# Patient Record
Sex: FEMALE | Race: WHITE | NOT HISPANIC OR LATINO | Employment: FULL TIME | URBAN - METROPOLITAN AREA
[De-identification: names, ages, dates, MRNs, and addresses within clinical notes are randomized per-mention and may not be internally consistent; named-entity substitution may affect disease eponyms.]

---

## 2017-08-30 ENCOUNTER — HISTORICAL (OUTPATIENT)
Dept: ADMINISTRATIVE | Facility: HOSPITAL | Age: 37
End: 2017-08-30

## 2017-10-06 ENCOUNTER — HISTORICAL (OUTPATIENT)
Dept: ADMINISTRATIVE | Facility: HOSPITAL | Age: 37
End: 2017-10-06

## 2017-10-06 LAB — DEPRECATED CALCIDIOL+CALCIFEROL SERPL-MC: 30.82 NG/ML (ref 30–80)

## 2017-11-15 ENCOUNTER — HISTORICAL (OUTPATIENT)
Dept: ADMINISTRATIVE | Facility: HOSPITAL | Age: 37
End: 2017-11-15

## 2017-11-15 LAB — DEPRECATED CALCIDIOL+CALCIFEROL SERPL-MC: 42.78 NG/ML (ref 30–80)

## 2018-02-28 ENCOUNTER — HISTORICAL (OUTPATIENT)
Dept: ADMINISTRATIVE | Facility: HOSPITAL | Age: 38
End: 2018-02-28

## 2018-02-28 LAB — DEPRECATED CALCIDIOL+CALCIFEROL SERPL-MC: 28.76 NG/ML (ref 30–80)

## 2018-07-25 ENCOUNTER — HISTORICAL (OUTPATIENT)
Dept: ADMINISTRATIVE | Facility: HOSPITAL | Age: 38
End: 2018-07-25

## 2023-09-21 DIAGNOSIS — F41.9 ANXIETY AND DEPRESSION: Primary | ICD-10-CM

## 2023-09-21 DIAGNOSIS — F32.A ANXIETY AND DEPRESSION: Primary | ICD-10-CM

## 2023-09-21 RX ORDER — DULOXETIN HYDROCHLORIDE 60 MG/1
60 CAPSULE, DELAYED RELEASE ORAL DAILY
Qty: 30 CAPSULE | Refills: 2 | Status: SHIPPED | OUTPATIENT
Start: 2023-09-21 | End: 2023-12-22 | Stop reason: SDUPTHER

## 2023-09-21 RX ORDER — DULOXETIN HYDROCHLORIDE 60 MG/1
60 CAPSULE, DELAYED RELEASE ORAL DAILY
COMMUNITY
Start: 2023-08-15 | End: 2023-09-21 | Stop reason: SDUPTHER

## 2023-11-29 DIAGNOSIS — E78.5 HYPERLIPIDEMIA, UNSPECIFIED HYPERLIPIDEMIA TYPE: Primary | ICD-10-CM

## 2023-11-29 RX ORDER — ROSUVASTATIN CALCIUM 40 MG/1
40 TABLET, COATED ORAL NIGHTLY
Qty: 30 TABLET | Refills: 0 | Status: SHIPPED | OUTPATIENT
Start: 2023-11-29 | End: 2023-12-26 | Stop reason: SDUPTHER

## 2023-12-13 ENCOUNTER — LAB VISIT (OUTPATIENT)
Dept: FAMILY MEDICINE | Facility: CLINIC | Age: 43
End: 2023-12-13
Payer: COMMERCIAL

## 2023-12-13 DIAGNOSIS — E78.5 HYPERLIPIDEMIA, UNSPECIFIED HYPERLIPIDEMIA TYPE: ICD-10-CM

## 2023-12-13 DIAGNOSIS — E11.9 DIABETES MELLITUS WITHOUT COMPLICATION: Primary | ICD-10-CM

## 2023-12-13 DIAGNOSIS — Z79.899 ENCOUNTER FOR LONG-TERM (CURRENT) USE OF OTHER MEDICATIONS: ICD-10-CM

## 2023-12-22 ENCOUNTER — TELEPHONE (OUTPATIENT)
Dept: FAMILY MEDICINE | Facility: CLINIC | Age: 43
End: 2023-12-22
Payer: COMMERCIAL

## 2023-12-22 DIAGNOSIS — F41.9 ANXIETY AND DEPRESSION: ICD-10-CM

## 2023-12-22 DIAGNOSIS — F32.A ANXIETY AND DEPRESSION: ICD-10-CM

## 2023-12-22 RX ORDER — DULOXETIN HYDROCHLORIDE 60 MG/1
60 CAPSULE, DELAYED RELEASE ORAL DAILY
Qty: 30 CAPSULE | Refills: 2 | Status: SHIPPED | OUTPATIENT
Start: 2023-12-22

## 2023-12-26 DIAGNOSIS — E78.5 HYPERLIPIDEMIA, UNSPECIFIED HYPERLIPIDEMIA TYPE: ICD-10-CM

## 2023-12-26 RX ORDER — ROSUVASTATIN CALCIUM 40 MG/1
40 TABLET, COATED ORAL NIGHTLY
Qty: 30 TABLET | Refills: 0 | Status: SHIPPED | OUTPATIENT
Start: 2023-12-26 | End: 2024-01-23 | Stop reason: SDUPTHER

## 2024-01-04 ENCOUNTER — OFFICE VISIT (OUTPATIENT)
Dept: FAMILY MEDICINE | Facility: CLINIC | Age: 44
End: 2024-01-04
Payer: COMMERCIAL

## 2024-01-04 VITALS
OXYGEN SATURATION: 98 % | WEIGHT: 184 LBS | HEART RATE: 95 BPM | BODY MASS INDEX: 34.74 KG/M2 | TEMPERATURE: 98 F | HEIGHT: 61 IN | RESPIRATION RATE: 17 BRPM | SYSTOLIC BLOOD PRESSURE: 130 MMHG | DIASTOLIC BLOOD PRESSURE: 80 MMHG

## 2024-01-04 DIAGNOSIS — I10 HYPERTENSION, UNSPECIFIED TYPE: ICD-10-CM

## 2024-01-04 DIAGNOSIS — E78.2 MIXED HYPERLIPIDEMIA: ICD-10-CM

## 2024-01-04 DIAGNOSIS — R73.03 PREDIABETES: Primary | ICD-10-CM

## 2024-01-04 DIAGNOSIS — F17.200 SMOKER: ICD-10-CM

## 2024-01-04 PROBLEM — E11.9 DM (DIABETES MELLITUS): Status: ACTIVE | Noted: 2024-01-04

## 2024-01-04 LAB
BILIRUB UR QL STRIP: NEGATIVE
GLUCOSE UR QL STRIP: NEGATIVE
KETONES UR QL STRIP: NEGATIVE
LEUKOCYTE ESTERASE UR QL STRIP: NEGATIVE
PH, POC UA: 6
POC BLOOD, URINE: NEGATIVE
POC NITRATES, URINE: NEGATIVE
PROT UR QL STRIP: NEGATIVE
SP GR UR STRIP: 1.02 (ref 1–1.03)
UROBILINOGEN UR STRIP-ACNC: NORMAL (ref 0.1–1.1)

## 2024-01-04 PROCEDURE — 1159F MED LIST DOCD IN RCRD: CPT | Mod: CPTII,,, | Performed by: FAMILY MEDICINE

## 2024-01-04 PROCEDURE — 3079F DIAST BP 80-89 MM HG: CPT | Mod: CPTII,,, | Performed by: FAMILY MEDICINE

## 2024-01-04 PROCEDURE — 81003 URINALYSIS AUTO W/O SCOPE: CPT | Mod: QW,,, | Performed by: FAMILY MEDICINE

## 2024-01-04 PROCEDURE — 3075F SYST BP GE 130 - 139MM HG: CPT | Mod: CPTII,,, | Performed by: FAMILY MEDICINE

## 2024-01-04 PROCEDURE — 3008F BODY MASS INDEX DOCD: CPT | Mod: CPTII,,, | Performed by: FAMILY MEDICINE

## 2024-01-04 PROCEDURE — 99214 OFFICE O/P EST MOD 30 MIN: CPT | Mod: ,,, | Performed by: FAMILY MEDICINE

## 2024-01-04 RX ORDER — CHOLECALCIFEROL (VITAMIN D3) 25 MCG
3000 TABLET ORAL DAILY
COMMUNITY

## 2024-01-04 NOTE — ASSESSMENT & PLAN NOTE
Pt. Is diet controled.  Follow ADA Diet. Avoid soda, simple sweets, and limit rice/pasta/breads/starches (no more than 45-50 grams per meal).  Maintain healthy weight with goal BMI <30.  Exercise 5 times per week for 30 minutes per day.  Stressed importance of daily foot exams especially if neuropathy is present.  Patient was instructed to notify physician if they notice any sores or skin lesions on the feet.  Stressed the importance of wearing proper fitting comfortable shoes i.e. diabetic footwear.  They were instructed to always wear shoes and never go barefooted.  Stressed importance of annual dilated eye exam.

## 2024-01-04 NOTE — LETTER
AUTHORIZATION FOR RELEASE OF   CONFIDENTIAL INFORMATION    Dear Dr. Harrison's Office,    We are seeing Katharine Diego, date of birth 1980, in the clinic at [facility]. Jhony Maldonado Sr., MD is the patient's PCP. Katharine Diego has an outstanding lab/procedure at the time we reviewed her chart. In order to help keep her health information updated, she has authorized us to request the following medical record(s):        ( X )  MAMMOGRAM                                      (  )  COLONOSCOPY      ( X )  PAP SMEAR                                          (  )  OUTSIDE LAB RESULTS     (  )  DEXA SCAN                                          (  )  EYE EXAM            (  )  FOOT EXAM                                          (  )  ENTIRE RECORD     (  )  OUTSIDE IMMUNIZATIONS                 (  )  _______________         Please fax records to [facility], Bourgeois, Leonard Jb. Sr., MD, [phone]     If you have any questions, please contact our office at [phone].           Patient Name: Katharine Diego  : 1980  Patient Phone #: [phone]

## 2024-01-04 NOTE — ASSESSMENT & PLAN NOTE
Lab Results   Component Value Date    TRIG 494 (H) 12/27/2023    HDL 42 12/27/2023    LDLCALC 87 12/27/2023     Patient's LDL is well controlled and at goal.

## 2024-01-04 NOTE — PROGRESS NOTES
Patient Name: Katharine Diego     Patient ID: [id number]     Chief Complaint: Results (Go over lab results.)      HPI:     Katharine Diego is a 43 y.o. female here today for follow up on pre diabetes, hypertension,and review of lab results.    Past Medical History:   Diagnosis Date    Anxiety disorder, unspecified     DM (diabetes mellitus)     Elevated lipids     History of gestational diabetes     History of migraine     HTN (hypertension)     Mixed hyperlipidemia     Neck pain     Obese     RLS (restless legs syndrome)     Smoker         Past Surgical History:   Procedure Laterality Date     SECTION      2    CHOLECYSTECTOMY      HYSTERECTOMY  2015    TONSILLECTOMY          Social History     Socioeconomic History    Marital status: Single    Number of children: 3   Tobacco Use    Smoking status: Every Day     Current packs/day: 1.00     Average packs/day: 1 pack/day for 30.0 years (30.0 ttl pk-yrs)     Types: Cigarettes     Start date:     Smokeless tobacco: Never   Substance and Sexual Activity    Alcohol use: Not Currently     Comment: occasionally    Drug use: Never    Sexual activity: Yes     Social Determinants of Health     Financial Resource Strain: Low Risk  (2024)    Overall Financial Resource Strain (CARDIA)     Difficulty of Paying Living Expenses: Not hard at all   Food Insecurity: No Food Insecurity (2024)    Hunger Vital Sign     Worried About Running Out of Food in the Last Year: Never true     Ran Out of Food in the Last Year: Never true   Transportation Needs: No Transportation Needs (2024)    PRAPARE - Transportation     Lack of Transportation (Medical): No     Lack of Transportation (Non-Medical): No   Physical Activity: Insufficiently Active (2024)    Exercise Vital Sign     Days of Exercise per Week: 5 days     Minutes of Exercise per Session: 20 min   Stress: No Stress Concern Present (2024)    [employer] - Occupational Stress  "Questionnaire     Feeling of Stress : Only a little   Social Connections: Moderately Integrated (1/4/2024)    Social Connection and Isolation Panel [NHANES]     Frequency of Communication with Friends and Family: Three times a week     Frequency of Social Gatherings with Friends and Family: Three times a week     Attends Mu-ism Services: More than 4 times per year     Active Member of Clubs or Organizations: Yes     Attends Club or Organization Meetings: More than 4 times per year     Marital Status: Never    Housing Stability: Unknown (1/4/2024)    Housing Stability Vital Sign     Unable to Pay for Housing in the Last Year: No     Unstable Housing in the Last Year: No        Current Outpatient Medications   Medication Instructions    ascorbic acid (vitamin C) (VITAMIN C) 1,000 mg, Oral, Daily    diphenhydramine HCl (ALLERGY ORAL) Oral, Daily    DULoxetine (CYMBALTA) 60 mg, Oral, Daily    ibuprofen (ADVIL,MOTRIN) 200 mg, Oral, Every 6 hours PRN    multivitamin (THERAGRAN) per tablet 1 tablet, Oral, Daily    rosuvastatin (CRESTOR) 40 mg, Oral, Nightly    vitamin D (VITAMIN D3) 3,000 Units, Oral, Daily       Review of patient's allergies indicates:  No Known Allergies       There is no immunization history on file for this patient.    Patient Care Team:  Jhony Maldonado Sr., MD as PCP - General (Family Medicine)  Juana Harrison MD (Obstetrics and Gynecology)     Subjective:     Review of Systems    10 point review of systems conducted, negative except as stated in the history of present illness. See HPI for details.    Objective:     Visit Vitals  /80 (BP Location: Left arm, Patient Position: Sitting, BP Method: Medium (Manual))   Pulse 95   Temp 98.3 °F (36.8 °C)   Resp 17   Ht 5' 0.5" (1.537 m)   Wt 83.5 kg (184 lb)   SpO2 98%   BMI 35.34 kg/m²       Physical Exam  Constitutional:       Appearance: She is obese.   HENT:      Head: Normocephalic and atraumatic.   Cardiovascular:      Rate and " Rhythm: Normal rate and regular rhythm.   Pulmonary:      Effort: Pulmonary effort is normal.      Breath sounds: Normal breath sounds.   Abdominal:      Palpations: Abdomen is soft.      Tenderness: There is no abdominal tenderness.   Musculoskeletal:         General: No swelling or tenderness. Normal range of motion.      Cervical back: Normal range of motion and neck supple.      Right lower leg: No edema.      Left lower leg: No edema.   Skin:     General: Skin is warm and dry.   Neurological:      General: No focal deficit present.      Mental Status: She is alert and oriented to person, place, and time.   Psychiatric:         Mood and Affect: Mood normal.         Assessment:       ICD-10-CM ICD-9-CM   1. Prediabetes  R73.03 790.29   2. Hypertension, unspecified type  I10 401.9   3. Mixed hyperlipidemia  E78.2 272.2   4. Smoker  F17.200 305.1       Plan:   1. Prediabetes  Overview:  Pt. is diet controlled.   Follow ADA Diet. Avoid soda, simple sweets, and limit rice/pasta/breads/starches (no more than 45-50 grams per meal).  Maintain healthy weight with goal BMI <30.  Exercise 5 times per week for 30 minutes per day.  Stressed importance of daily foot exams especially if neuropathy is present.  Patient was instructed to notify physician if they notice any sores or skin lesions on the feet.  Stressed the importance of wearing proper fitting comfortable shoes i.e. diabetic footwear.  They were instructed to always wear shoes and never go barefooted.  Stressed importance of annual dilated eye exam.     Assessment & Plan:  12/27/2023 A1C=6.2%. Pt. Well controlled.    Orders:  -     POCT Urinalysis, Dipstick, Automated, W/O Scope    2. Hypertension, unspecified type  Overview:  Low Sodium Diet (DASH Diet - Less than 2 grams of sodium per day).  Monitor blood pressure daily and log. Report consistent numbers greater than 140/90.  Maintain healthy weight with goal BMI <30. Exercise 30 minutes per day, 5 days per week.      Assessment & Plan:  Patient's blood pressure is well controlled and at goal.      3. Mixed hyperlipidemia  Overview:  Current medication: Crestor 40 mg one po q day.   Stressed importance of dietary modifications. Follow a low cholesterol, low saturated fat diet with less that 200mg of cholesterol a day.  Present medication: Crestor 40 mg one po q hs.  Avoid fried foods and high saturated fats (high saturated fats less than 7% of calories).  Add Flax Seed/Fish Oil supplements to diet. Increase dietary fiber.  Regular exercise can reduce LDL and raise HDL. Stressed importance of physical activity 5 times per week for 30 minutes per day.      Assessment & Plan:  Lab Results   Component Value Date    TRIG 494 (H) 12/27/2023    HDL 42 12/27/2023    LDLCALC 87 12/27/2023     Patient's LDL is well controlled and at goal.        4. Smoker  Overview:  Patient was offered smoking cessation techniques such as nicotine gum or patches.  They were also offered a more regimented smoking cessation program.  They were advised to decrease the number of cigarettes by 1 every few days until they completely stopped.  It was strongly recommended that they set a quit date and stick to it.  And finally, important health reasons to stop smoking were given to the patient. Approximately 5 minutes was spent discussing smoking cessation.     Assessment & Plan:  Patient says she is presently smoking approximately 1 pack of cigarettes a day.          [x] Discussed lab findings with the patient.  [x] Discussed diet, exercise and if appropriate, weight loss.  [] Instructions and information, including risks and benefits of prescribed medication(s) have been reviewed with the patient and patient verbalizes understanding. Questions have been answered to the patient's satisfaction.  [] Appropriate counseling has been given regarding anxiety and depressive issues that were discussed today.  [] Any lab drawn today will be reviewed by physician at  the time it is received and appropriate recommendations bill be made and discussed with patient.     Follow up in about 4 months (around 5/4/2024) for Diabetes Follow up with HbA1c.   In addition to their scheduled follow up, the patient has also been instructed to follow up on as needed basis.     Future Appointments   Date Time Provider Department Center   7/8/2024  7:40 AM LAB, [facility]   7/11/2024  3:30 PM Jhony Maldonado Sr., MD [facility] Maldonado Sr, MD

## 2024-01-23 DIAGNOSIS — E78.5 HYPERLIPIDEMIA, UNSPECIFIED HYPERLIPIDEMIA TYPE: ICD-10-CM

## 2024-01-23 RX ORDER — ROSUVASTATIN CALCIUM 40 MG/1
40 TABLET, COATED ORAL NIGHTLY
Qty: 90 TABLET | Refills: 1 | Status: SHIPPED | OUTPATIENT
Start: 2024-01-23 | End: 2024-05-20

## 2024-03-15 DIAGNOSIS — F41.9 ANXIETY AND DEPRESSION: ICD-10-CM

## 2024-03-15 DIAGNOSIS — F32.A ANXIETY AND DEPRESSION: ICD-10-CM

## 2024-03-15 DIAGNOSIS — E78.5 HYPERLIPIDEMIA, UNSPECIFIED HYPERLIPIDEMIA TYPE: ICD-10-CM

## 2024-05-20 RX ORDER — ROSUVASTATIN CALCIUM 40 MG/1
40 TABLET, COATED ORAL NIGHTLY
Qty: 90 TABLET | Refills: 0 | Status: SHIPPED | OUTPATIENT
Start: 2024-05-20

## 2024-05-20 RX ORDER — DULOXETIN HYDROCHLORIDE 60 MG/1
60 CAPSULE, DELAYED RELEASE ORAL DAILY
Qty: 90 CAPSULE | Refills: 0 | Status: SHIPPED | OUTPATIENT
Start: 2024-05-20

## 2024-07-29 DIAGNOSIS — E78.5 HYPERLIPIDEMIA, UNSPECIFIED HYPERLIPIDEMIA TYPE: ICD-10-CM

## 2024-07-29 RX ORDER — ROSUVASTATIN CALCIUM 40 MG/1
40 TABLET, COATED ORAL NIGHTLY
Qty: 90 TABLET | Refills: 0 | Status: SHIPPED | OUTPATIENT
Start: 2024-07-29

## 2024-10-16 ENCOUNTER — OFFICE VISIT (OUTPATIENT)
Dept: FAMILY MEDICINE | Facility: CLINIC | Age: 44
End: 2024-10-16
Payer: COMMERCIAL

## 2024-10-16 VITALS
HEART RATE: 72 BPM | SYSTOLIC BLOOD PRESSURE: 122 MMHG | DIASTOLIC BLOOD PRESSURE: 84 MMHG | HEIGHT: 61 IN | RESPIRATION RATE: 20 BRPM | WEIGHT: 186.19 LBS | BODY MASS INDEX: 35.15 KG/M2 | OXYGEN SATURATION: 96 % | TEMPERATURE: 97 F

## 2024-10-16 DIAGNOSIS — M48.02 SPINAL STENOSIS, CERVICAL REGION: ICD-10-CM

## 2024-10-16 DIAGNOSIS — M54.12 CERVICAL RADICULOPATHY: Primary | ICD-10-CM

## 2024-10-16 PROCEDURE — 1160F RVW MEDS BY RX/DR IN RCRD: CPT | Mod: CPTII,,, | Performed by: FAMILY MEDICINE

## 2024-10-16 PROCEDURE — 3079F DIAST BP 80-89 MM HG: CPT | Mod: CPTII,,, | Performed by: FAMILY MEDICINE

## 2024-10-16 PROCEDURE — 1159F MED LIST DOCD IN RCRD: CPT | Mod: CPTII,,, | Performed by: FAMILY MEDICINE

## 2024-10-16 PROCEDURE — 99213 OFFICE O/P EST LOW 20 MIN: CPT | Mod: ,,, | Performed by: FAMILY MEDICINE

## 2024-10-16 PROCEDURE — 3074F SYST BP LT 130 MM HG: CPT | Mod: CPTII,,, | Performed by: FAMILY MEDICINE

## 2024-10-16 PROCEDURE — 3008F BODY MASS INDEX DOCD: CPT | Mod: CPTII,,, | Performed by: FAMILY MEDICINE

## 2024-10-16 NOTE — ASSESSMENT & PLAN NOTE
Patient requests referral to Dr. Apley but she was informed he is retired now  MRI cervical spine with and without  Then refer to neurosurgery if indicated

## 2024-10-16 NOTE — LETTER
AUTHORIZATION FOR RELEASE OF   CONFIDENTIAL INFORMATION    Dear Dr Harrison,    We are seeing Kathairne Diego, date of birth 1980, in the clinic at [facility]. Jhony Maldonado Sr., MD is the patient's PCP. Katharine Diego has an outstanding lab/procedure at the time we reviewed her chart. In order to help keep her health information updated, she has authorized us to request the following medical record(s):        ( X )  MAMMOGRAM                                      (  )  COLONOSCOPY      ( X )  PAP SMEAR                                          (  )  OUTSIDE LAB RESULTS     (  )  DEXA SCAN                                          (  )  EYE EXAM            (  )  FOOT EXAM                                          (  )  ENTIRE RECORD     (  )  OUTSIDE IMMUNIZATIONS                 (  )  _______________         Please fax records to [facility], Bourgeois, Leonard Jb. Sr., MD, [phone]     If you have any questions, please contact [facility] at [phone]          Patient Name: Katharine Diego  : 1980  Patient Phone #: [phone]

## 2024-10-30 DIAGNOSIS — M54.12 CERVICAL RADICULOPATHY: ICD-10-CM

## 2024-10-30 DIAGNOSIS — M48.02 CERVICAL STENOSIS OF SPINE: Primary | ICD-10-CM

## 2024-11-04 ENCOUNTER — TELEPHONE (OUTPATIENT)
Dept: NEUROSURGERY | Facility: CLINIC | Age: 44
End: 2024-11-04
Payer: COMMERCIAL

## 2024-11-14 ENCOUNTER — OFFICE VISIT (OUTPATIENT)
Dept: FAMILY MEDICINE | Facility: CLINIC | Age: 44
End: 2024-11-14
Payer: COMMERCIAL

## 2024-11-14 VITALS
WEIGHT: 182.38 LBS | DIASTOLIC BLOOD PRESSURE: 78 MMHG | SYSTOLIC BLOOD PRESSURE: 120 MMHG | TEMPERATURE: 98 F | BODY MASS INDEX: 34.43 KG/M2 | RESPIRATION RATE: 20 BRPM | HEART RATE: 86 BPM | HEIGHT: 61 IN | OXYGEN SATURATION: 97 %

## 2024-11-14 DIAGNOSIS — R71.8 ELEVATED MCV: ICD-10-CM

## 2024-11-14 DIAGNOSIS — I10 HYPERTENSION, UNSPECIFIED TYPE: ICD-10-CM

## 2024-11-14 DIAGNOSIS — E78.2 MIXED HYPERLIPIDEMIA: ICD-10-CM

## 2024-11-14 DIAGNOSIS — Z00.00 ENCOUNTER FOR ANNUAL HEALTH EXAMINATION: Primary | ICD-10-CM

## 2024-11-14 DIAGNOSIS — F17.200 SMOKER: ICD-10-CM

## 2024-11-14 DIAGNOSIS — F33.41 RECURRENT MAJOR DEPRESSIVE DISORDER, IN PARTIAL REMISSION: ICD-10-CM

## 2024-11-14 DIAGNOSIS — E66.01 SEVERE OBESITY (BMI 35.0-39.9) WITH COMORBIDITY: ICD-10-CM

## 2024-11-14 DIAGNOSIS — R73.03 PREDIABETES: ICD-10-CM

## 2024-11-14 RX ORDER — ROSUVASTATIN CALCIUM 40 MG/1
40 TABLET, COATED ORAL NIGHTLY
Qty: 90 TABLET | Refills: 0 | Status: SHIPPED | OUTPATIENT
Start: 2024-11-14

## 2024-11-14 RX ORDER — DULOXETIN HYDROCHLORIDE 60 MG/1
60 CAPSULE, DELAYED RELEASE ORAL DAILY
Qty: 90 CAPSULE | Refills: 0 | Status: SHIPPED | OUTPATIENT
Start: 2024-11-14

## 2024-11-14 NOTE — ASSESSMENT & PLAN NOTE
A1c 5.9%.  Discussed starting Metformin or Mounjaro.  Prefers continuing lifestyle changes regarding diet and exercise.  Repeat A1c 6mos.    Follow ADA Diet. Avoid soda, simple sweets, and limit rice/pasta/breads/starches (no more than 45-50 grams per meal).  Maintain healthy weight with goal BMI <30.  Exercise 5 times per week for 30 minutes per day.  Stressed importance of daily foot exams.  Stressed importance of annual dilated eye exam.

## 2024-11-14 NOTE — PROGRESS NOTES
Family Medicine Clinic    Patient ID: [id number]     Chief Complaint: Annual Exam, Results (Patient here for lab results.), and Depression (Patient would like to possibly go up on her Duloxetine.)      HPI:     Katharine Diego is a 44 y.o. female here today for an annual wellness visit. Reviewed and discussed lab results. Overall she feels well. No other complaints today.     Health Maintenance         Date Due Completion Date    Hepatitis C Screening Never done ---    Pneumococcal Vaccines (Age 0-64) (1 of 2 - PCV) Never done ---    HIV Screening Never done ---    TETANUS VACCINE 2013    Colorectal Cancer Screening Never done ---    Mammogram 2024    COVID-19 Vaccine (2024- season) Never done ---    Hemoglobin A1c (Prediabetes) 2025    Lipid Panel 2029    RSV Vaccine (Age 60+ and Pregnant patients) (1 - 1-dose 75+ series) 2053 ---             Past Medical History:   Diagnosis Date    Anxiety disorder, unspecified     DM (diabetes mellitus)     Elevated lipids     History of gestational diabetes     History of migraine     HTN (hypertension)     Mixed hyperlipidemia     Neck pain     Obese     RLS (restless legs syndrome)     Smoker         Past Surgical History:   Procedure Laterality Date     SECTION      2    CHOLECYSTECTOMY      HYSTERECTOMY  2015    TONSILLECTOMY          Social History     Tobacco Use    Smoking status: Every Day     Current packs/day: 1.00     Average packs/day: 1 pack/day for 30.9 years (30.9 ttl pk-yrs)     Types: Cigarettes     Start date:     Smokeless tobacco: Never   Substance and Sexual Activity    Alcohol use: Not Currently     Comment: occasionally    Drug use: Never    Sexual activity: Yes        Current Outpatient Medications   Medication Instructions    ascorbic acid (vitamin C) (VITAMIN C) 1,000 mg, Daily    diphenhydramine HCl (ALLERGY ORAL) Daily    DULoxetine (CYMBALTA) 60 mg, Oral, Daily  "   ibuprofen (ADVIL,MOTRIN) 200 mg, Every 6 hours PRN    multivitamin (THERAGRAN) per tablet 1 tablet, Daily    rosuvastatin (CRESTOR) 40 mg, Oral, Nightly    vitamin D (VITAMIN D3) 3,000 Units, Daily       Review of patient's allergies indicates:  No Known Allergies     Patient Care Team:  Jhony Maldonado Sr., MD as PCP - General (Family Medicine)  Juana Harrison MD (Obstetrics and Gynecology)     Subjective:     Review of Systems    12 point review of systems conducted, negative except as stated in the history of present illness. See HPI for details.    Objective:     Visit Vitals  /78 (Patient Position: Sitting)   Pulse 86   Temp 97.8 °F (36.6 °C)   Resp 20   Ht 5' 0.5" (1.537 m)   Wt 82.7 kg (182 lb 6.4 oz)   SpO2 97%   BMI 35.04 kg/m²       Physical Exam    Labs Reviewed:     Chemistry:  Lab Results   Component Value Date     11/11/2024    K 4.6 11/11/2024    BUN 11 11/11/2024    CREATININE 0.76 11/11/2024    EGFRNORACEVR 98 11/11/2024    CALCIUM 9.6 11/11/2024    ALBUMIN 4.9 11/11/2024    BILIDIR <0.20 12/27/2023    AST 26 11/11/2024    ALT 22 11/11/2024    QCZNJWDQ02ME 44.1 11/11/2024    TSH 0.842 11/11/2024        Lab Results   Component Value Date    HGBA1C 5.9 (H) 11/11/2024        Hematology:  Lab Results   Component Value Date    WBC 10.3 11/11/2024    HGB 15.2 11/11/2024    HCT 46.5 11/11/2024     11/11/2024       Lipid Panel:  Lab Results   Component Value Date    CHOL 224 (H) 11/11/2024    HDL 44 11/11/2024    TRIG 250 (H) 11/11/2024        Urine:  No results found for: "COLORUA", "APPEARANCEUA", "SGUA", "PHUA", "PROTEINUA", "GLUCOSEUA", "KETONESUA", "BLOODUA", "NITRITESUA", "LEUKOCYTESUR", "RBCUA", "WBCUA", "BACTERIA", "SQEPUA", "HYALINECASTS", "CREATRANDUR", "PROTEINURINE", "UPROTCREA"     Assessment:       ICD-10-CM ICD-9-CM   1. Encounter for annual health examination  Z00.00 V70.0   2. Prediabetes  R73.03 790.29   3. Elevated MCV  R71.8 790.09   4. Mixed hyperlipidemia  " E78.2 272.2   5. Hypertension, unspecified type  I10 401.9   6. Smoker  F17.200 305.1   7. Severe obesity (BMI 35.0-39.9) with comorbidity  E66.01 278.01   8. Recurrent major depressive disorder, in partial remission  F33.41 296.35        Plan:     Cervical Cancer Screening -  Hysterectomy 2015. Will follow up with Dr. Harrison, GYN for Pap/Pelvic.      Breast Cancer Screening - Last Mammogram on January 30, 2023. Normal. Follow up in 1 year. Sees Dr. Harrison, GYN, who has placed an ordered for mammogram to be performed; pt will schedule.    Colon Cancer Screening - order for Cologuard placed today. .    Eye Exam - Recommend annually.    Dental Exam - Recommend biannual exams.     Vaccinations - declines all vaccines today.       1. Encounter for annual health examination  Comments:  Reviewed labs and medications. Follow up with clinic as scheduled.    2. Prediabetes  Overview:  Pt. is diet controlled.       Assessment & Plan:  A1c 5.9%.  Discussed starting Metformin or Mounjaro.  Prefers continuing lifestyle changes regarding diet and exercise.  Repeat A1c 6mos.    Follow ADA Diet. Avoid soda, simple sweets, and limit rice/pasta/breads/starches (no more than 45-50 grams per meal).  Maintain healthy weight with goal BMI <30.  Exercise 5 times per week for 30 minutes per day.  Stressed importance of daily foot exams.  Stressed importance of annual dilated eye exam.           3. Elevated MCV  Overview:   today.  No prior history.  No anemia.    Assessment & Plan:  Repeat CBC 3 months with Vitamin B12 and Folate.      4. Mixed hyperlipidemia  Overview:  Current medication: Crestor 40 mg one po q day.     Assessment & Plan:   today.  NonHDLc 180 today.  Not at goal (<100).  Pt reports not taking Crestor 40 mg po daily as prescribed.  Pt will take Crestor 40 mg po daily, and repeat Lipid panel in 3 mos.    Stressed importance of dietary modifications. Follow a low cholesterol, low saturated fat diet with less that  200mg of cholesterol a day.  Present medication: Crestor 40 mg one po q hs.  Avoid fried foods and high saturated fats (high saturated fats less than 7% of calories).  Add Flax Seed/Fish Oil supplements to diet. Increase dietary fiber.  Regular exercise can reduce LDL and raise HDL. Stressed importance of physical activity 5 times per week for 30 minutes per day.        5. Hypertension, unspecified type  Overview:  Not currently on HTN medications.      Assessment & Plan:  Well controlled.   Will continue to monitor.    Low Sodium Diet (DASH Diet - Less than 2 grams of sodium per day).  Monitor blood pressure daily and log. Report consistent numbers greater than 140/90.  Maintain healthy weight with goal BMI <30. Exercise 30 minutes per day, 5 days per week.  Smoking cessation encouraged to aid in BP reduction.       6. Smoker  Overview:  Patient was offered smoking cessation techniques such as nicotine gum or patches.  They were also offered a more regimented smoking cessation program.  They were advised to decrease the number of cigarettes by 1 every few days until they completely stopped.  It was strongly recommended that they set a quit date and stick to it.  And finally, important health reasons to stop smoking were given to the patient. Approximately 5 minutes was spent discussing smoking cessation.     Assessment & Plan:  Currently smoking 1 pack per day.  Not ready to consider smoking cessation.  Has tired Chantix, but due to cost, was unable to continue; will consider possibly restarting Chantix in the future.      7. Severe obesity (BMI 35.0-39.9) with comorbidity  Overview:  Reports several year hx of unhealthy BMI.    Assessment & Plan:  Body mass index is 35.04 kg/m².  Goal BMI <30.  Exercise 5 times a week for 30 minutes per day.  Avoid soda, simple sugars, excessive rice, potatoes or bread. Limit fast foods and fried foods.  Choose complex carbs in moderation (example: green vegetables, beans,  oatmeal). Eat plenty of fresh fruits and vegetables with lean meats daily.  Do not skip meals. Eat a balanced portion size.  Avoid fad diets. Consider permanent healthy life style changes.        8. Recurrent major depressive disorder, in partial remission  Overview:  Duloxetine (Cymbalta) 60 mg po taking daily.    Assessment & Plan:  Reports depression is currently controlled with current medication.  Reports current dosage of Cymbalta 60 mg not working as effectively as when initially started.  Infrequent, occasional feelings of mild depression, but believes such occasions are related to days when forgets to take Cymbalta.  Will start taking Cymbalta 60 mg daily, as prescribed.  Will prescribe refills today.  No thoughts of SI/HI.  Will re-evaluate at next visit.             Follow up in about 3 months (around 2/14/2025). In addition to their scheduled follow up, the patient has also been instructed to follow up on as needed basis.     Future Appointments   Date Time Provider Department Center   12/3/2024 10:00 AM Deepthi Armendariz PA-C [facility]   2/17/2025  9:40 AM LAB, [facility] FAMILY MED [facility] ESTER Short

## 2024-11-14 NOTE — PROGRESS NOTES
[facility]    Patient ID: [id number]     Chief Complaint: Annual Exam, Results (Patient here for lab results.), and Depression (Patient would like to possibly go up on her Duloxetine.)      HPI:     Katharine Diego is a 44 y.o. female here today for an annual wellness visit. Reviewed and discussed lab results. Overall she feels well. No other complaints today.     Health Maintenance         Date Due Completion Date    Hepatitis C Screening Never done ---    Pneumococcal Vaccines (Age 0-64) (1 of 2 - PCV) Never done ---    HIV Screening Never done ---    TETANUS VACCINE 2013    Colorectal Cancer Screening Never done ---    Mammogram 2024    COVID-19 Vaccine (2024- season) Never done ---    Hemoglobin A1c (Prediabetes) 2025    Lipid Panel 2029    RSV Vaccine (Age 60+ and Pregnant patients) (1 - 1-dose 75+ series) 2053 ---             Past Medical History:   Diagnosis Date    Anxiety disorder, unspecified     DM (diabetes mellitus)     Elevated lipids     History of gestational diabetes     History of migraine     HTN (hypertension)     Mixed hyperlipidemia     Neck pain     Obese     RLS (restless legs syndrome)     Smoker         Past Surgical History:   Procedure Laterality Date     SECTION      2    CHOLECYSTECTOMY      HYSTERECTOMY  2015    TONSILLECTOMY          Social History     Tobacco Use    Smoking status: Every Day     Current packs/day: 1.00     Average packs/day: 1 pack/day for 30.9 years (30.9 ttl pk-yrs)     Types: Cigarettes     Start date:     Smokeless tobacco: Never   Substance and Sexual Activity    Alcohol use: Not Currently     Comment: occasionally    Drug use: Never    Sexual activity: Yes        Current Outpatient Medications   Medication Instructions    ascorbic acid (vitamin C) (VITAMIN C) 1,000 mg, Daily    diphenhydramine HCl (ALLERGY ORAL) Daily    DULoxetine (CYMBALTA) 60 mg, Oral, Daily  "   ibuprofen (ADVIL,MOTRIN) 200 mg, Every 6 hours PRN    multivitamin (THERAGRAN) per tablet 1 tablet, Daily    rosuvastatin (CRESTOR) 40 mg, Oral, Nightly    vitamin D (VITAMIN D3) 3,000 Units, Daily       Review of patient's allergies indicates:  No Known Allergies     Patient Care Team:  Jhony Maldonado Sr., MD as PCP - General (Family Medicine)  Juana Harrison MD (Obstetrics and Gynecology)     Subjective:     Review of Systems   Constitutional: Negative.    HENT: Negative.     Eyes: Negative.    Respiratory: Negative.     Cardiovascular: Negative.    Gastrointestinal: Negative.    Skin: Negative.    Allergic/Immunologic: Negative.    Neurological: Negative.    Psychiatric/Behavioral: Negative.         Objective:     Visit Vitals  /78 (Patient Position: Sitting)   Pulse 86   Temp 97.8 °F (36.6 °C)   Resp 20   Ht 5' 0.5" (1.537 m)   Wt 82.7 kg (182 lb 6.4 oz)   SpO2 97%   BMI 35.04 kg/m²       Physical Exam  Vitals and nursing note reviewed.   Constitutional:       General: She is not in acute distress.     Appearance: She is not ill-appearing.   HENT:      Head: Normocephalic and atraumatic.      Mouth/Throat:      Mouth: Mucous membranes are moist.      Pharynx: Oropharynx is clear.   Eyes:      General: No scleral icterus.     Extraocular Movements: Extraocular movements intact.      Conjunctiva/sclera: Conjunctivae normal.      Pupils: Pupils are equal, round, and reactive to light.   Neck:      Vascular: No carotid bruit.   Cardiovascular:      Rate and Rhythm: Normal rate and regular rhythm.      Heart sounds: No murmur heard.     No friction rub. No gallop.   Pulmonary:      Effort: Pulmonary effort is normal. No respiratory distress.      Breath sounds: Normal breath sounds. No wheezing, rhonchi or rales.   Musculoskeletal:         General: Normal range of motion.      Cervical back: Normal range of motion and neck supple.   Skin:     General: Skin is warm and dry.   Neurological:      " "General: No focal deficit present.      Mental Status: She is alert.   Psychiatric:         Mood and Affect: Mood normal.         Labs Reviewed:     Chemistry:  Lab Results   Component Value Date     11/11/2024    K 4.6 11/11/2024    BUN 11 11/11/2024    CREATININE 0.76 11/11/2024    EGFRNORACEVR 98 11/11/2024    CALCIUM 9.6 11/11/2024    ALBUMIN 4.9 11/11/2024    BILIDIR <0.20 12/27/2023    AST 26 11/11/2024    ALT 22 11/11/2024    [id number] 44.1 11/11/2024    TSH 0.842 11/11/2024        Lab Results   Component Value Date    HGBA1C 5.9 (H) 11/11/2024        Hematology:  Lab Results   Component Value Date    WBC 10.3 11/11/2024    HGB 15.2 11/11/2024    HCT 46.5 11/11/2024     11/11/2024       Lipid Panel:  Lab Results   Component Value Date    CHOL 224 (H) 11/11/2024    HDL 44 11/11/2024    TRIG 250 (H) 11/11/2024        Urine:  No results found for: "COLORUA", "APPEARANCEUA", "SGUA", "PHUA", "PROTEINUA", "GLUCOSEUA", "KETONESUA", "BLOODUA", "NITRITESUA", "LEUKOCYTESUR", "RBCUA", "WBCUA", "BACTERIA", "SQEPUA", "HYALINECASTS", "CREATRANDUR", "PROTEINURINE", "UPROTCREA"     Assessment:       ICD-10-CM ICD-9-CM   1. Encounter for annual health examination  Z00.00 V70.0   2. Prediabetes  R73.03 790.29   3. Elevated MCV  R71.8 790.09   4. Mixed hyperlipidemia  E78.2 272.2   5. Hypertension, unspecified type  I10 401.9   6. Smoker  F17.200 305.1   7. Severe obesity (BMI 35.0-39.9) with comorbidity  E66.01 278.01   8. Recurrent major depressive disorder, in partial remission  F33.41 296.35        Plan:     Cervical Cancer Screening -  Hysterectomy 2015. Follow up with Dr. Harrison, GYN for Pap/Pelvic.      Breast Cancer Screening - Last mammogram on January 30, 2023. See Dr. Harrison, GYN, who has placed order for mammogram; pt will schedule.     Colon Cancer Screening - order for Cologuard placed today.    Vaccinations - declines all vaccines today.            Follow up in about 3 months (around 2/14/2025). In " addition to their scheduled follow up, the patient has also been instructed to follow up on as needed basis.     Future Appointments   Date Time Provider Department Center   12/3/2024 10:00 AM Deepthi Armendariz PA-C [facility]   2/17/2025  9:40 AM LAB, [facility] [place]

## 2024-11-14 NOTE — ASSESSMENT & PLAN NOTE
today.  NonHDLc 180 today.  Not at goal (<100).  Pt reports not taking Crestor 40 mg po daily as prescribed.  Pt will take Crestor 40 mg po daily, and repeat Lipid panel in 3 mos.    Stressed importance of dietary modifications. Follow a low cholesterol, low saturated fat diet with less that 200mg of cholesterol a day.  Present medication: Crestor 40 mg one po q hs.  Avoid fried foods and high saturated fats (high saturated fats less than 7% of calories).  Add Flax Seed/Fish Oil supplements to diet. Increase dietary fiber.  Regular exercise can reduce LDL and raise HDL. Stressed importance of physical activity 5 times per week for 30 minutes per day.

## 2024-11-14 NOTE — ASSESSMENT & PLAN NOTE
Well controlled.   Will continue to monitor.    Low Sodium Diet (DASH Diet - Less than 2 grams of sodium per day).  Monitor blood pressure daily and log. Report consistent numbers greater than 140/90.  Maintain healthy weight with goal BMI <30. Exercise 30 minutes per day, 5 days per week.  Smoking cessation encouraged to aid in BP reduction.

## 2024-11-14 NOTE — ASSESSMENT & PLAN NOTE

## 2024-11-14 NOTE — ASSESSMENT & PLAN NOTE
Reports depression is currently controlled with current medication.  Reports current dosage of Cymbalta 60 mg not working as effectively as when initially started.  Infrequent, occasional feelings of mild depression, but believes such occasions are related to days when forgets to take Cymbalta.  Will start taking Cymbalta 60 mg daily, as prescribed.  Will prescribe refills today.  No thoughts of SI/HI.  Will re-evaluate at next visit.

## 2024-11-14 NOTE — ASSESSMENT & PLAN NOTE
Currently smoking 1 pack per day.  Not ready to consider smoking cessation.  Has tired Chantix, but due to cost, was unable to continue; will consider possibly restarting Chantix in the future.

## 2024-12-03 ENCOUNTER — OFFICE VISIT (OUTPATIENT)
Dept: NEUROSURGERY | Facility: CLINIC | Age: 44
End: 2024-12-03
Payer: COMMERCIAL

## 2024-12-03 VITALS
RESPIRATION RATE: 16 BRPM | DIASTOLIC BLOOD PRESSURE: 72 MMHG | WEIGHT: 184.38 LBS | HEIGHT: 60 IN | HEART RATE: 75 BPM | SYSTOLIC BLOOD PRESSURE: 121 MMHG | BODY MASS INDEX: 36.2 KG/M2

## 2024-12-03 DIAGNOSIS — M47.22 CERVICAL SPONDYLOSIS WITH RADICULOPATHY: Primary | ICD-10-CM

## 2024-12-03 DIAGNOSIS — M54.12 CERVICAL RADICULOPATHY: ICD-10-CM

## 2024-12-03 DIAGNOSIS — M48.02 CERVICAL STENOSIS OF SPINE: ICD-10-CM

## 2024-12-03 PROCEDURE — 3008F BODY MASS INDEX DOCD: CPT | Mod: CPTII,,, | Performed by: PHYSICIAN ASSISTANT

## 2024-12-03 PROCEDURE — 99205 OFFICE O/P NEW HI 60 MIN: CPT | Mod: 25,,, | Performed by: PHYSICIAN ASSISTANT

## 2024-12-03 PROCEDURE — 99406 BEHAV CHNG SMOKING 3-10 MIN: CPT | Mod: ,,, | Performed by: PHYSICIAN ASSISTANT

## 2024-12-03 PROCEDURE — 3078F DIAST BP <80 MM HG: CPT | Mod: CPTII,,, | Performed by: PHYSICIAN ASSISTANT

## 2024-12-03 PROCEDURE — 3044F HG A1C LEVEL LT 7.0%: CPT | Mod: CPTII,,, | Performed by: PHYSICIAN ASSISTANT

## 2024-12-03 PROCEDURE — 1159F MED LIST DOCD IN RCRD: CPT | Mod: CPTII,,, | Performed by: PHYSICIAN ASSISTANT

## 2024-12-03 PROCEDURE — 3074F SYST BP LT 130 MM HG: CPT | Mod: CPTII,,, | Performed by: PHYSICIAN ASSISTANT

## 2024-12-03 PROCEDURE — 1160F RVW MEDS BY RX/DR IN RCRD: CPT | Mod: CPTII,,, | Performed by: PHYSICIAN ASSISTANT

## 2024-12-03 RX ORDER — MELOXICAM 15 MG/1
15 TABLET ORAL DAILY
Qty: 30 TABLET | Refills: 0 | Status: SHIPPED | OUTPATIENT
Start: 2024-12-03

## 2024-12-03 RX ORDER — CYCLOBENZAPRINE HCL 10 MG
10 TABLET ORAL 3 TIMES DAILY PRN
Qty: 30 TABLET | Refills: 2 | Status: SHIPPED | OUTPATIENT
Start: 2024-12-03 | End: 2024-12-13
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
## 2024-12-20 ENCOUNTER — PATIENT MESSAGE (OUTPATIENT)
Dept: NEUROSURGERY | Facility: CLINIC | Age: 44
End: 2024-12-20
Payer: COMMERCIAL

## 2024-12-20 ENCOUNTER — TELEPHONE (OUTPATIENT)
Dept: NEUROSURGERY | Facility: CLINIC | Age: 44
End: 2024-12-20
Payer: COMMERCIAL

## 2024-12-20 NOTE — TELEPHONE ENCOUNTER
I tried to call the patient to see if she could possibly come in to see Dr. Sexton on 1/8/25 at 9:00 instead of 1/9/25. She did not answer so I left a detailed message requesting a call back. I will also send a patient portal.

## 2025-01-31 ENCOUNTER — TELEPHONE (OUTPATIENT)
Dept: FAMILY MEDICINE | Facility: CLINIC | Age: 45
End: 2025-01-31
Payer: COMMERCIAL

## 2025-02-06 ENCOUNTER — OFFICE VISIT (OUTPATIENT)
Dept: FAMILY MEDICINE | Facility: CLINIC | Age: 45
End: 2025-02-06
Payer: COMMERCIAL

## 2025-02-06 VITALS
RESPIRATION RATE: 20 BRPM | OXYGEN SATURATION: 99 % | TEMPERATURE: 98 F | WEIGHT: 188 LBS | HEART RATE: 88 BPM | DIASTOLIC BLOOD PRESSURE: 76 MMHG | HEIGHT: 60 IN | BODY MASS INDEX: 36.91 KG/M2 | SYSTOLIC BLOOD PRESSURE: 114 MMHG

## 2025-02-06 DIAGNOSIS — N94.9 ADNEXAL CYST: Primary | ICD-10-CM

## 2025-02-06 PROCEDURE — 99213 OFFICE O/P EST LOW 20 MIN: CPT | Mod: ,,,

## 2025-02-06 PROCEDURE — 1160F RVW MEDS BY RX/DR IN RCRD: CPT | Mod: CPTII,,,

## 2025-02-06 PROCEDURE — 1159F MED LIST DOCD IN RCRD: CPT | Mod: CPTII,,,

## 2025-02-06 PROCEDURE — 3074F SYST BP LT 130 MM HG: CPT | Mod: CPTII,,,

## 2025-02-06 PROCEDURE — 3078F DIAST BP <80 MM HG: CPT | Mod: CPTII,,,

## 2025-02-06 PROCEDURE — 3008F BODY MASS INDEX DOCD: CPT | Mod: CPTII,,,

## 2025-02-06 RX ORDER — KETOROLAC TROMETHAMINE 10 MG/1
10 TABLET, FILM COATED ORAL EVERY 6 HOURS PRN
COMMUNITY
Start: 2025-01-31

## 2025-02-06 RX ORDER — METHOCARBAMOL 500 MG/1
1000 TABLET, FILM COATED ORAL EVERY 6 HOURS PRN
COMMUNITY
Start: 2025-01-31
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
## 2025-02-07 ENCOUNTER — TELEPHONE (OUTPATIENT)
Dept: FAMILY MEDICINE | Facility: CLINIC | Age: 45
End: 2025-02-07
Payer: COMMERCIAL

## 2025-02-07 NOTE — TELEPHONE ENCOUNTER
Scheduled pt's US with [facility] for 2/13/25 at 10:00. Lvm with patient to let her know

## 2025-02-14 ENCOUNTER — TELEPHONE (OUTPATIENT)
Dept: FAMILY MEDICINE | Facility: CLINIC | Age: 45
End: 2025-02-14
Payer: COMMERCIAL

## 2025-02-18 ENCOUNTER — TELEPHONE (OUTPATIENT)
Dept: FAMILY MEDICINE | Facility: CLINIC | Age: 45
End: 2025-02-18
Payer: COMMERCIAL

## 2025-02-18 NOTE — LETTER
AUTHORIZATION FOR RELEASE OF   CONFIDENTIAL INFORMATION    Dear Medical Records,    We are seeing Katharine Diego, date of birth 1980, in the clinic at [facility]. Jhony Maldonado Sr., MD is the patient's PCP. Katharine Diego has an outstanding lab/procedure at the time we reviewed her chart. In order to help keep her health information updated, she has authorized us to request the following medical record(s):        (  )  MAMMOGRAM                                      (  )  COLONOSCOPY      (  )  PAP SMEAR                                          (  )  OUTSIDE LAB RESULTS     (  )  DEXA SCAN                                          (  )  EYE EXAM            (  )  FOOT EXAM                                          (  )  ENTIRE RECORD     (  )  OUTSIDE IMMUNIZATIONS                 ( x )  ____US pelvis limited___________         Please fax records to [facility], Bourgeois, Leonard Jb. Sr., MD, [phone]     If you have any questions, please contact Genny Gonzalez LPN at [phone].           Patient Name: Katharine Diego  : 1980  Patient Phone #: [phone]

## 2025-02-19 ENCOUNTER — DOCUMENTATION ONLY (OUTPATIENT)
Dept: FAMILY MEDICINE | Facility: CLINIC | Age: 45
End: 2025-02-19
Payer: COMMERCIAL

## 2025-03-10 DIAGNOSIS — F33.41 RECURRENT MAJOR DEPRESSIVE DISORDER, IN PARTIAL REMISSION: ICD-10-CM

## 2025-03-10 RX ORDER — DULOXETIN HYDROCHLORIDE 60 MG/1
60 CAPSULE, DELAYED RELEASE ORAL DAILY
Qty: 90 CAPSULE | Refills: 0 | Status: SHIPPED | OUTPATIENT
Start: 2025-03-10

## 2025-03-21 ENCOUNTER — HOSPITAL ENCOUNTER (OUTPATIENT)
Dept: RADIOLOGY | Facility: HOSPITAL | Age: 45
Discharge: HOME | End: 2025-03-21
Payer: COMMERCIAL

## 2025-03-21 DIAGNOSIS — N94.9 ADNEXAL CYST: ICD-10-CM

## 2025-03-21 PROCEDURE — 76857 US EXAM PELVIC LIMITED: CPT | Mod: TC

## 2025-03-24 ENCOUNTER — RESULTS FOLLOW-UP (OUTPATIENT)
Dept: FAMILY MEDICINE | Facility: CLINIC | Age: 45
End: 2025-03-24
Payer: COMMERCIAL

## 2025-03-24 DIAGNOSIS — N94.9 ADNEXAL CYST: Primary | ICD-10-CM

## 2025-03-26 ENCOUNTER — OFFICE VISIT (OUTPATIENT)
Dept: FAMILY MEDICINE | Facility: CLINIC | Age: 45
End: 2025-03-26
Payer: COMMERCIAL

## 2025-03-26 VITALS
DIASTOLIC BLOOD PRESSURE: 71 MMHG | WEIGHT: 186 LBS | RESPIRATION RATE: 16 BRPM | HEART RATE: 81 BPM | SYSTOLIC BLOOD PRESSURE: 107 MMHG | OXYGEN SATURATION: 97 % | BODY MASS INDEX: 36.52 KG/M2 | HEIGHT: 60 IN | TEMPERATURE: 99 F

## 2025-03-26 DIAGNOSIS — E78.2 MIXED HYPERLIPIDEMIA: Primary | ICD-10-CM

## 2025-03-26 DIAGNOSIS — N94.9 ADNEXAL CYST: ICD-10-CM

## 2025-03-26 DIAGNOSIS — D72.829 LEUKOCYTOSIS, UNSPECIFIED TYPE: ICD-10-CM

## 2025-03-26 DIAGNOSIS — R73.03 PREDIABETES: ICD-10-CM

## 2025-03-26 DIAGNOSIS — I10 HYPERTENSION, UNSPECIFIED TYPE: ICD-10-CM
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
## 2025-03-26 NOTE — ASSESSMENT & PLAN NOTE
Ultrasound shows lobulated mass   MRI order change today to be done in [place].  She will call for an appointment and it will be done this week

## 2025-03-26 NOTE — ASSESSMENT & PLAN NOTE
At goal for prediabetes   Repeat six-months  Discussed that targets are lowered should she become diabetic

## 2025-05-06 DIAGNOSIS — E78.2 MIXED HYPERLIPIDEMIA: Primary | ICD-10-CM

## 2025-05-06 RX ORDER — ROSUVASTATIN CALCIUM 40 MG/1
40 TABLET, COATED ORAL NIGHTLY
Qty: 90 TABLET | Refills: 1 | Status: SHIPPED | OUTPATIENT
Start: 2025-05-06

## 2025-05-13 ENCOUNTER — PATIENT OUTREACH (OUTPATIENT)
Facility: CLINIC | Age: 45
End: 2025-05-13
Payer: COMMERCIAL

## 2025-05-13 NOTE — LETTER
AUTHORIZATION FOR RELEASE OF CONFIDENTIAL INFORMATION      2025      Dear [facility],    We are seeing Katharine Diego, date of birth 1980, in the clinic at No Department Specified.  Jhony Maldonado Sr., MD is the patient's PCP. Katharine Diego has an outstanding lab/procedure at the time we reviewed his chart.  In order to help keep her health information updated, Katharine has authorized us to request the following medical record(s):        Mammogram             Please fax any records to Jhony Maldonado Sr., MD's at  [phone]        If you have any questions you can contact Fabby Lauren at [phone].             Patient Name: Katharine Diego  :1980  Patient Phone #:[phone]                                    Katharine Diego  MRN: [id number]  : 1980  Age: 44 y.o.  Sex: female         Patient/Legal Guardian Signature  This signature was collected at 10/16/2024    Katharine Diego       _______________________________   Printed Name/Relationship to Patient      Consent for Examination and Treatment: I hereby authorize the providers and employees of [facility] ([facility]) to provide medical treatment/services which includes, but is not limited to, performing and administering tests and diagnostic procedures that are deemed necessary, including, but not limited to, imaging examinations, blood tests and other laboratory procedures as may be required by the hospital, clinic, or may be ordered by my physician(s) or persons working under the general and/or special instructions of my physician(s).      I understand and agree that this consent covers all authorized persons, including but not limited to physicians, residents, nurse practitioners, physicians' assistants, specialists, consultants, student nurses, and independently contracted physicians, who are called upon by the physician in charge, to carry out the diagnostic procedures and medical or surgical treatment.     I  hereby authorize [facility] to retain or dispose of any specimens or tissue, should there be such remaining from any test or procedure.     I hereby authorize and give consent for [facility] providers and employees to take photographs, images or videotapes of such diagnostic, surgical or treatment procedures of Patient as may be required by [facility] or as may be ordered by a physician. I further acknowledge and agree that [facility] may use cameras or other devices for patient monitoring.     I am aware that the practice of medicine is not an exact science, and I acknowledge that no guarantees have been made to me as to the outcome of any tests, procedures or treatment.     Authorization for Release of Information: I understand that my insurance company and/or their agents may need information necessary to make determinations about payment/reimbursement. I hereby provide authorization to release to all insurance companies, their successors, assignees, other parties with whom they may have contracted, or others acting on their behalf, that are involved with payment for any hospital and/or clinic charges incurred by the patient, any information that they request and deem necessary for payment/reimbursement, and/or quality review.  I further authorize the release of my health information to physicians or other health care practitioners on staff who are involved in my health care now and in the future, and to other health care providers, entities, or institutions for the purpose of my continued care and treatment, including referrals.     REGISTRATION AUTHORIZATION  Form No. 25223 (Rev. 3/25/2024)    Page 1 of 3                       Medicare Patient's Certification and Authorization to Release Information and Payment Request:  I certify that the information given by me in applying for payment under Title XVIII of the Social Security Act is correct. I authorize any hogue of medical or other information about me to release to the  [facility], or its intermediaries or carriers, any information needed for this or a related Medicare claim. I request that payment of authorized benefits be made on my behalf.     Assignment of Insurance Benefits:   I hereby authorize any and all insurance companies, health plans, defined   benefit plans, health insurers or any entity that is or may be responsible for payment of my medical expenses to pay all hospital and medical benefits now due, and to become due and payable to me under any hospital benefits, sick benefits, injury benefits or any other benefit for services rendered to me, including Major Medical Benefits, direct to [facility] and all independently contracted physicians. I assign any and all rights that I may have against any and all insurance companies, health plans, defined benefit plans, health insurers or any entity that is or may be responsible for payment of my medical expenses, including, but not limited to any right to appeal a denial of a claim, any right to bring any action, lawsuit, administrative proceeding, or other cause of action on my behalf. I specifically assign my right to pursue litigation against any and all insurance companies, health plans, defined benefit plans, health insurers or any entity that is or may be responsible for payment of my medical expenses based upon a refusal to pay charges.            E. Valuables: It is understood and agreed that [facility] is not liable for the damage to or loss of any money, jewelry,   documents, dentures, eye glasses, hearing aids, prosthetics, or other property of value.     F. Computer Equipment: I understand and agree that should I choose to use computer equipment owned by [facility] or if I choose to access the Internet via [facility]s network, I do so at my own risk. [facility] is not responsible for any damage to my computer equipment or to any damages of any type that might arise from my loss of equipment or data.     G.  Acceptance of Financial Responsibility:  I agree that in consideration of the services and   supplies that have been   or will be furnished to the patient, I am hereby obligated to pay all charges made for or on the account of the patient according to the standard rates (in effect at the time the services and supplies are delivered) established by [facility], including its Patient Financial Assistance Policy to the extent it is applicable. I understand that I am responsible for all charges, or portions thereof, not covered by insurance or other sources. Patient refunds will be distributed only after balances at all [facility] facilities are paid.     H. Communication Authorization:  I hereby authorize [facility] and its representatives, along with any billing service   or  who may work on their behalf, to contact me on   my cell phone and/or home phone using pre- recorded messages, artificial voice messages, automatic telephone dialing devices or other computer assisted technology, or by electronic      mail, text messaging, or by any other form of electronic communication. This includes, but is not limited to, appointment reminders, yearly physical exam reminders, preventive care reminders, patient campaigns, welcome calls, and calls about account balances on my account or any account on which I am listed as a guarantor. I understand I have the right to opt out of these communications at any time.      Relationship  Between  Facility and  Provider:      I understand that some, but not all, providers furnishing services to the patient are not employees or agents of [facility]. The patient is under the care and supervision of his/her attending physician, and it is the responsibility of the facility and its nursing staff to carry out the instructions of such physicians. It is the responsibility of the patient's physician/designee to obtain the patient's informed consent, when required, for medical or surgical  treatment, special diagnostic or therapeutic procedures, or hospital services rendered for the patient under the special instructions of the physician/designee.           REGISTRATION AUTHORIZATION  Form No. 00364 (Rev. 3/25/2024)    Page 2 of 3                       Immunizations: [facility] shares immunization information with state sponsored health departments to help you and your doctor keep track of your immunization records. By signing, you consent to have this information shared with the health department in your state:                                [place] - LINKS ([place] Immunization Network for Kids Statewide)                                [place] - MIIX ([place] Immunization Information eXchange)                                [place] - ImmPRINT (Immunization Patient Registry with Integrated Technology)     TERM: This authorization is valid for this and subsequent care/treatment I receive at [facility] and will remain valid unless/until revoked in writing by me.     [facility]: As used in this document, [facility] means all [facility] owned and managed facilities, including, but not limited to, all health centers, surgery centers, clinics, urgent care centers, and hospitals.         [facility] complies with applicable Federal civil rights laws and does not discriminate on the basis of race, color, national origin, age, disability, or sex.  ATENCIÓN: si habla español, tiene a garvin disposición servicios gratuitos de asistencia lingüística. Vaibhav gomez [phone].  CHÚ Ý: N?u b?n nói Ti?ng Vi?t, có các d?ch v? h? tr? ngôn ng? mi?n phí dành cho b?n. G?i s? [phone].        REGISTRATION AUTHORIZATION  Form No. 61371 (Rev. 3/25/2024)   Page 3 of 3     Patient

## 2025-05-13 NOTE — PROGRESS NOTES
Health Maintenance Topic(s) Outreach Outcomes & Actions Taken:    Breast Cancer Screening - Outreach Outcomes & Actions Taken  : External Records Requested & Care Team Updated if Applicable and DESMOND sent to [facility]       Additional Notes:

## 2025-06-05 DIAGNOSIS — F33.41 RECURRENT MAJOR DEPRESSIVE DISORDER, IN PARTIAL REMISSION: ICD-10-CM

## 2025-07-30 ENCOUNTER — TELEPHONE (OUTPATIENT)
Dept: PHARMACY | Facility: CLINIC | Age: 45
End: 2025-07-30
Payer: COMMERCIAL

## 2025-07-31 NOTE — TELEPHONE ENCOUNTER
[facility] Chart Review & Patient Outreach Details For Medication Adherence Project    Reason for Outreach Encounter: 3rd Party payor non-compliance report ([employer], [employer], [employer], etc)  2.  Patient Outreach Method: Reviewed patient chart  and [employer] message  3.   Medication in question:    Hyperlipidemia Medications              rosuvastatin (CRESTOR) 40 MG Tab Take 1 tablet (40 mg total) by mouth every evening.                  rosuvastatin  last filled  2/7/25 for 90 day supply      4.  Reviewed and or Updates Made To: Patient Chart  5. Outreach Outcomes and/or actions taken: Sent inquiry to patient: Waiting for response and Patient reminded to  prescription  Additional Notes:

## 2025-08-01 RX ORDER — DULOXETIN HYDROCHLORIDE 60 MG/1
60 CAPSULE, DELAYED RELEASE ORAL
Qty: 90 CAPSULE | Refills: 0 | OUTPATIENT
Start: 2025-08-01

## 2025-08-01 NOTE — TELEPHONE ENCOUNTER
[facility] Chart Review & Patient Outreach Details For Medication Adherence Project    Reason for Outreach Encounter: Follow up to a previous patient outreach  2.  Patient Outreach Method: Reviewed patient chart  and [employer] message  3.   Medication in question:    Hyperlipidemia Medications              rosuvastatin (CRESTOR) 40 MG Tab Take 1 tablet (40 mg total) by mouth every evening.                   4.  Reviewed and or Updates Made To: Patient Chart  5. Outreach Outcomes and/or actions taken: Patient requested refill to be sent to their pharmacy  Additional Notes:

## 2025-09-03 ENCOUNTER — TELEPHONE (OUTPATIENT)
Dept: PHARMACY | Facility: CLINIC | Age: 45
End: 2025-09-03
Payer: COMMERCIAL